# Patient Record
(demographics unavailable — no encounter records)

---

## 2024-12-30 NOTE — CURRENT MEDS
[Takes medication as prescribed] : takes [None] : Patient does not have any barriers to medication adherence [Yes] : Reviewed medication list for presence of high-risk medications. [FreeTextEntry1] : Reports compliance.

## 2024-12-30 NOTE — HISTORY OF PRESENT ILLNESS
[Spouse] : spouse [FreeTextEntry1] :  77y/o M with PMHx of GERD, DDD, HLD, HTN, IGT, BPH, depression, anxiety, hearing loss, PTSD, DM for 3 month follow up. Pt went to neurologist in August due to memory loss and prescribed Donepezil. Pt denies any concerns or complaints. [de-identified] :  75y/o M with PMHx of GERD, DDD, HLD, HTN, IGT, BPH, depression, anxiety, hearing loss, PTSD, DM for 3 month follow up. Pt went to neurologist in August due to memory loss and prescribed Donepezil. Pt denies any concerns or complaints.

## 2024-12-30 NOTE — REVIEW OF SYSTEMS
[Patient Intake Form Reviewed] : Patient intake form was reviewed [Hearing Loss] : hearing loss [Negative] : Heme/Lymph [Fever] : no fever [Chills] : no chills [Hot Flashes] : no hot flashes [Night Sweats] : no night sweats [Recent Change In Weight] : ~T no recent weight change [Discharge] : no discharge [Pain] : no pain [Redness] : no redness [Vision Problems] : no vision problems [Itching] : no itching [Earache] : no earache [Nasal Discharge] : no nasal discharge [Sore Throat] : no sore throat [Chest Pain] : no chest pain [Palpitations] : no palpitations [Shortness Of Breath] : no shortness of breath [Wheezing] : no wheezing [Cough] : no cough [Abdominal Pain] : no abdominal pain [Nausea] : no nausea [Constipation] : no constipation [Diarrhea] : no diarrhea [Vomiting] : no vomiting [Heartburn] : no heartburn [Melena] : no melena [Dysuria] : no dysuria [Incontinence] : no incontinence [Hesitancy] : no hesitancy [Frequency] : no frequency [Impotence] : no impotency [Joint Pain] : no joint pain [Muscle Pain] : no muscle pain [Muscle Weakness] : no muscle weakness [Back Pain] : no back pain [Joint Swelling] : no joint swelling [Itching] : no itching [Mole Changes] : no mole changes [Nail Changes] : no nail changes [Hair Changes] : no hair changes [Skin Rash] : no skin rash [Headache] : no headache [Memory Loss] : no memory loss [Suicidal] : not suicidal [Anxiety] : no anxiety [Depression] : no depression [Easy Bleeding] : no easy bleeding [Easy Bruising] : no easy bruising [Swollen Glands] : no swollen glands [FreeTextEntry4] : Hearing loss, tinnitus.

## 2024-12-30 NOTE — PLAN
[FreeTextEntry1] :  77y/o M with PMHx of GERD, DDD, HLD, HTN, IGT, BPH, depression, anxiety, hearing loss, PTSD, DM for 3 month follow up. Pt went to neurologist in August due to memory loss and prescribed Donepezil. Pt denies any concerns or complaints.  Pt care plan reviewed Medications and allergies reviewed Labs Drawn  RTC in 3 months

## 2024-12-30 NOTE — COUNSELING
[Fall prevention counseling provided] : Fall prevention counseling provided [Adequate lighting] : Adequate lighting [No throw rugs] : No throw rugs [Use proper foot wear] : Use proper foot wear [Behavioral health counseling provided] : Behavioral health counseling provided [Sleep ___ hours/day] : Sleep [unfilled] hours/day [Engage in a relaxing activity] : Engage in a relaxing activity [AUDIT-C Screening administered and reviewed] : AUDIT-C Screening administered and reviewed [Potential consequences of obesity discussed] : Potential consequences of obesity discussed [Benefits of weight loss discussed] : Benefits of weight loss discussed [Encouraged to increase physical activity] : Encouraged to increase physical activity [Encouraged to use exercise tracking device] : Encouraged to use exercise tracking device [Weigh Self Weekly] : weigh self weekly [Decrease Portions] : decrease portions [None] : None [Good understanding] : Patient has a good understanding of lifestyle changes and steps needed to achieve self management goal [FreeTextEntry2] : Never Smoker

## 2024-12-30 NOTE — HEALTH RISK ASSESSMENT
[Yes] : Yes [Monthly or less (1 pt)] : Monthly or less (1 point) [1 or 2 (0 pts)] : 1 or 2 (0 points) [Never (0 pts)] : Never (0 points) [No] : In the past 12 months have you used drugs other than those required for medical reasons? No [No falls in past year] : Patient reported no falls in the past year [0] : 2) Feeling down, depressed, or hopeless: Not at all (0) [PHQ-2 Negative - No further assessment needed] : PHQ-2 Negative - No further assessment needed [Patient/Caregiver not ready to engage] : , patient/caregiver not ready to engage [I will adhere to the patient's wishes.] : I will adhere to the patient's wishes. [Time Spent: ___ minutes] : Time Spent: [unfilled] minutes [Never] : Never [Audit-CScore] : 1 [de-identified] : average  [de-identified] : average  [facility] : [facility] [[id number]] : 0 [AdvancecareDate] : 06/22

## 2025-03-06 NOTE — HISTORY OF PRESENT ILLNESS
[FreeTextEntry1] : I saw him initially 8/6/2024 with the following history. This 75-year-old man was seen in neurological consultation today accompanied by his wife He has had some memory loss going on for the last year or 2, slowly progressive He has loud tinnitus and is hard of hearing which complicates the problem.  He refuses to wear a hearing aid. Manages fine with activities of daily living He does drive safely but wife has to give him directions a lot He has not done anything that would cause danger to himself or others  He had a brain MRI 11/2/2023 which showed small vessel ischemic changes Wife said he had an EEG which was normal. Thyroid functions and B12 have been normal  Medical history significant for hyperlipidemia, hypertension, anxiety and depression, low back pain for which she sees pain management  I started him on donepezil 5 mg a day Comes in today with his wife She reports some increased fatigue since starting the donepezil Memory has remained stable No GI complaints

## 2025-03-06 NOTE — PHYSICAL EXAM
[General Appearance - Alert] : alert [General Appearance - In No Acute Distress] : in no acute distress [General Appearance - Well-Appearing] : healthy appearing [Affect] : the affect was normal [Mood] : the mood was normal [Total Score ___ / 30] : the patient achieved a score of [unfilled] /30 [Date / Time ___ / 5] : date / time [unfilled] / 5 [Place ___ / 5] : place [unfilled] / 5 [Registration ___ / 3] : registration [unfilled] / 3 [Serial Sevens ___/5] : serial sevens [unfilled] / 5 [Naming 2 Objects ___ / 2] : naming two objects [unfilled] / 2 [Repeating a Sentence ___ / 1] : repeating a sentence [unfilled] / 1 [Writing a Sentence ___ / 1] : write sentence [unfilled] / 1 [3-stage Verbal Command ___ / 3] : three-stage verbal command [unfilled] / 3 [Written Command ___ / 1] : written command [unfilled] / 1 [Copy a Design ___ / 1] : copy a design [unfilled] / 1 [Recall ___ / 3] : recall [unfilled] / 3 [Cranial Nerves Optic (II)] : visual acuity intact bilaterally,  visual fields full to confrontation, pupils equal round and reactive to light [Cranial Nerves Oculomotor (III)] : extraocular motion intact [Cranial Nerves Facial (VII)] : face symmetrical [Cranial Nerves Hypoglossal (XII)] : there was no tongue deviation with protrusion [Motor Tone] : muscle tone was normal in all four extremities [Motor Strength] : muscle strength was normal in all four extremities [Paresis Pronator Drift Right-Sided] : no pronator drift on the right [Paresis Pronator Drift Left-Sided] : no pronator drift on the left [Sensation Pain / Temperature Decrease] : pain and temperature was intact [Romberg's Sign] : Romberg's sign was negtive [Abnormal Walk] : normal gait [Coordination - Dysmetria Impaired Finger-to-Nose Bilateral] : not present [1+] : Patella left 1+ [FreeTextEntry5] : Poor hearing [PERRL With Normal Accommodation] : pupils were equal in size, round, reactive to light, with normal accommodation [Extraocular Movements] : extraocular movements were intact [Full Visual Field] : full visual field

## 2025-03-06 NOTE — ASSESSMENT
[FreeTextEntry1] : Likely dementia Scored 22/30 on Mini-Mental state examination testing Workup has been unrevealing  I have explained in great detail that currently available medications work to help slow down further memory decline rather than reverse existing memory loss and they seem to understand this.  I have stressed the importance of mental and physical activity, adequate hydration, and eating a healthy Mediterranean style diet.  They are anxious to try any medication that might be helpful Will try increasing the donepezil to the therapeutic dose of 10 mg a day. Potential side effects especially gastrointestinal have been discussed  Office follow-up scheduled in 6 months and by phone prior to that if needed.

## 2025-04-15 NOTE — HEALTH RISK ASSESSMENT
[Yes] : Yes [Monthly or less (1 pt)] : Monthly or less (1 point) [1 or 2 (0 pts)] : 1 or 2 (0 points) [Never (0 pts)] : Never (0 points) [No] : In the past 12 months have you used drugs other than those required for medical reasons? No [No falls in past year] : Patient reported no falls in the past year [0] : 2) Feeling down, depressed, or hopeless: Not at all (0) [PHQ-2 Negative - No further assessment needed] : PHQ-2 Negative - No further assessment needed [Patient/Caregiver not ready to engage] : , patient/caregiver not ready to engage [I will adhere to the patient's wishes.] : I will adhere to the patient's wishes. [Time Spent: ___ minutes] : Time Spent: [unfilled] minutes [Never] : Never [Audit-CScore] : 1 [de-identified] : average  [de-identified] : average  [facility]] : 7 [[id number]] : 0 [AdvancecareDate] : 06/22

## 2025-04-15 NOTE — HISTORY OF PRESENT ILLNESS
[Spouse] : spouse [FreeTextEntry1] :  77y/o M with PMHx of GERD, DDD, HLD, HTN, IGT, BPH, depression, anxiety, hearing loss, PTSD, DM who presents for 3 month follow up. [de-identified] :  75y/o M with PMHx of GERD, DDD, HLD, HTN, IGT, BPH, depression, anxiety, hearing loss, PTSD, DM who presents for 3 month follow up. Patient states that he has back pain and is seeing a pain management specialist. Pt is accompanied by his wife and states that he stopped the donepezil prescribed by his neurologist due to sedation. Pt denies any other concerns or complaints. Patient denies any recent hospitalizations or illnesses. Denies fevers, chills, SOB, chest pain, or abdominal pain.

## 2025-04-15 NOTE — PLAN
[FreeTextEntry1] :  75y/o M with PMHx of GERD, DDD, HLD, HTN, IGT, BPH, depression, anxiety, hearing loss, PTSD, DM who presents for 3 month follow up. Patient states that he has back pain and is seeing a pain management specialist. Pt is accompanied by his wife and states that he stopped the donepezil prescribed by his neurologist due to sedation. Pt denies any other concerns or complaints. Patient denies any recent hospitalizations or illnesses. Denies fevers, chills, SOB, chest pain, or abdominal pain.  Pt care plan reviewed Discussed benefits of vitamins for his memory Medications reviewed Labs Drawn  RTC in 3 months

## 2025-04-15 NOTE — REVIEW OF SYSTEMS
[Patient Intake Form Reviewed] : Patient intake form was reviewed [Hearing Loss] : hearing loss [Negative] : Heme/Lymph [Memory Loss] : memory loss [Fever] : no fever [Chills] : no chills [Hot Flashes] : no hot flashes [Night Sweats] : no night sweats [Recent Change In Weight] : ~T no recent weight change [Discharge] : no discharge [Pain] : no pain [Redness] : no redness [Vision Problems] : no vision problems [Itching] : no itching [Earache] : no earache [Nasal Discharge] : no nasal discharge [Sore Throat] : no sore throat [Chest Pain] : no chest pain [Palpitations] : no palpitations [Shortness Of Breath] : no shortness of breath [Wheezing] : no wheezing [Cough] : no cough [Abdominal Pain] : no abdominal pain [Nausea] : no nausea [Constipation] : no constipation [Diarrhea] : no diarrhea [Vomiting] : no vomiting [Heartburn] : no heartburn [Melena] : no melena [Dysuria] : no dysuria [Incontinence] : no incontinence [Hesitancy] : no hesitancy [Frequency] : no frequency [Impotence] : no impotency [Joint Pain] : no joint pain [Muscle Pain] : no muscle pain [Muscle Weakness] : no muscle weakness [Back Pain] : back pain [Joint Swelling] : no joint swelling [Itching] : no itching [Mole Changes] : no mole changes [Nail Changes] : no nail changes [Hair Changes] : no hair changes [Skin Rash] : no skin rash [Headache] : no headache [Suicidal] : not suicidal [Anxiety] : no anxiety [Depression] : no depression [Easy Bleeding] : no easy bleeding [Easy Bruising] : no easy bruising [Swollen Glands] : no swollen glands [FreeTextEntry4] : Hearing loss, tinnitus.

## 2025-04-15 NOTE — ASSESSMENT
[FreeTextEntry1] :  77y/o M with PMHx of GERD, DDD, HLD, HTN, IGT, BPH, depression, anxiety, hearing loss, PTSD, DM who presents for 3 month follow up. Patient states that he has back pain and is seeing a pain management specialist. Pt is accompanied by his wife and states that he stopped the donepezil prescribed by his neurologist due to sedation. Pt denies any other concerns or complaints. Patient denies any recent hospitalizations or illnesses. Denies fevers, chills, SOB, chest pain, or abdominal pain.  Pt care plan reviewed Medications reviewed Labs Drawn  RTC in 3 months

## 2025-05-29 NOTE — ASSESSMENT
[FreeTextEntry1] :  75y/o M with PMHx of GERD, DDD, HLD, HTN, IGT, BPH, depression, anxiety, hearing loss, PTSD, DM who presents for 3 month follow up. Patient states that he has back pain and is seeing a pain management specialist. Pt is accompanied by his wife and states that he stopped the donepezil prescribed by his neurologist due to sedation. Pt denies any other concerns or complaints. Patient denies any recent hospitalizations or illnesses. Denies fevers, chills, SOB, chest pain, or abdominal pain.  Pt care plan reviewed Medications reviewed Labs Drawn  RTC in 3 months

## 2025-05-29 NOTE — PLAN
[FreeTextEntry1] : 77y/o M with PMHx of GERD, DDD, HLD, HTN, IGT, BPH, depression, anxiety, hearing loss, PTSD, DM who recently presented to [facility] on May 17th for SOB, coughing. Chest Xray was unremarkable. EEG was complete but unsure of results. Pt then underwent cardiac catheterization for reason unknown to pt and wife. Pt was started on metoprolol during hospital stay due to uncontrolled HTN. Pt to follow up with cardiologist. Per disc with wife, pt states patient has been doing significantly better. Pt denies recent illness, trauma. Pt denies CP, SOB, Fevers, chills, abd pain, n/v/d/c.   Pt care plan reviewed Medications reviewed RTC in 1 months

## 2025-05-29 NOTE — HISTORY OF PRESENT ILLNESS
[FreeTextEntry2] : 75y/o M with PMHx of GERD, DDD, HLD, HTN, IGT, BPH, depression, anxiety, hearing loss, PTSD, DM who recently presented to [facility] on May 17th for SOB, coughing. Chest Xray was unremarkable. EEG was complete but unsure of results. Pt then underwent cardiac catheterization for reason unknown to pt and wife. Pt was started on metoprolol during hospital stay due to uncontrolled HTN. Pt to follow up with cardiologist. Per disc with wife, pt states patient has been doing significantly better. Pt denies recent illness, trauma. Pt denies CP, SOB, Fevers, chills, abd pain, n/v/d/c.

## 2025-05-29 NOTE — HEALTH RISK ASSESSMENT
[Yes] : Yes [Monthly or less (1 pt)] : Monthly or less (1 point) [1 or 2 (0 pts)] : 1 or 2 (0 points) [Never (0 pts)] : Never (0 points) [No] : In the past 12 months have you used drugs other than those required for medical reasons? No [No falls in past year] : Patient reported no falls in the past year [0] : 2) Feeling down, depressed, or hopeless: Not at all (0) [PHQ-2 Negative - No further assessment needed] : PHQ-2 Negative - No further assessment needed [Patient/Caregiver not ready to engage] : , patient/caregiver not ready to engage [I will adhere to the patient's wishes.] : I will adhere to the patient's wishes. [Time Spent: ___ minutes] : Time Spent: [unfilled] minutes [Never] : Never [Audit-CScore] : 1 [de-identified] : average  [de-identified] : average  [facility] [[id number]] : 0 [AdvancecareDate] : 06/22

## 2025-05-29 NOTE — HISTORY OF PRESENT ILLNESS
[FreeTextEntry2] : 75y/o M with PMHx of GERD, DDD, HLD, HTN, IGT, BPH, depression, anxiety, hearing loss, PTSD, DM who recently presented to [facility] on May 17th for SOB, coughing. Chest Xray was unremarkable. EEG was complete but unsure of results. Pt then underwent cardiac catheterization for reason unknown to pt and wife. Pt was started on metoprolol during hospital stay due to uncontrolled HTN. Pt to follow up with cardiologist. Per disc with wife, pt states patient has been doing significantly better. Pt denies recent illness, trauma. Pt denies CP, SOB, Fevers, chills, abd pain, n/v/d/c.

## 2025-05-29 NOTE — HEALTH RISK ASSESSMENT
[Yes] : Yes [Monthly or less (1 pt)] : Monthly or less (1 point) [1 or 2 (0 pts)] : 1 or 2 (0 points) [Never (0 pts)] : Never (0 points) [No] : In the past 12 months have you used drugs other than those required for medical reasons? No [No falls in past year] : Patient reported no falls in the past year [0] : 2) Feeling down, depressed, or hopeless: Not at all (0) [PHQ-2 Negative - No further assessment needed] : PHQ-2 Negative - No further assessment needed [Patient/Caregiver not ready to engage] : , patient/caregiver not ready to engage [I will adhere to the patient's wishes.] : I will adhere to the patient's wishes. [Time Spent: ___ minutes] : Time Spent: [unfilled] minutes [Never] : Never [Audit-CScore] : 1 [de-identified] : average  [de-identified] : average  [facility]] : 7 [[id number]] : 0 [AdvancecareDate] : 06/22

## 2025-05-29 NOTE — PLAN
[FreeTextEntry1] : 75y/o M with PMHx of GERD, DDD, HLD, HTN, IGT, BPH, depression, anxiety, hearing loss, PTSD, DM who recently presented to [facility] on May 17th for SOB, coughing. Chest Xray was unremarkable. EEG was complete but unsure of results. Pt then underwent cardiac catheterization for reason unknown to pt and wife. Pt was started on metoprolol during hospital stay due to uncontrolled HTN. Pt to follow up with cardiologist. Per disc with wife, pt states patient has been doing significantly better. Pt denies recent illness, trauma. Pt denies CP, SOB, Fevers, chills, abd pain, n/v/d/c.   Pt care plan reviewed Medications reviewed RTC in 1 months

## 2025-05-29 NOTE — REVIEW OF SYSTEMS
[Patient Intake Form Reviewed] : Patient intake form was reviewed [Hearing Loss] : hearing loss [Back Pain] : back pain [Memory Loss] : memory loss [Negative] : Heme/Lymph [Fever] : no fever [Chills] : no chills [Hot Flashes] : no hot flashes [Night Sweats] : no night sweats [Recent Change In Weight] : ~T no recent weight change [Discharge] : no discharge [Pain] : no pain [Redness] : no redness [Vision Problems] : no vision problems [Itching] : no itching [Earache] : no earache [Nasal Discharge] : no nasal discharge [Sore Throat] : no sore throat [Chest Pain] : no chest pain [Palpitations] : no palpitations [Shortness Of Breath] : no shortness of breath [Wheezing] : no wheezing [Cough] : no cough [Abdominal Pain] : no abdominal pain [Nausea] : no nausea [Constipation] : no constipation [Diarrhea] : no diarrhea [Vomiting] : no vomiting [Heartburn] : no heartburn [Melena] : no melena [Dysuria] : no dysuria [Incontinence] : no incontinence [Hesitancy] : no hesitancy [Frequency] : no frequency [Impotence] : no impotency [Joint Pain] : no joint pain [Muscle Pain] : no muscle pain [Muscle Weakness] : no muscle weakness [Joint Swelling] : no joint swelling [Itching] : no itching [Mole Changes] : no mole changes [Nail Changes] : no nail changes [Hair Changes] : no hair changes [Skin Rash] : no skin rash [Headache] : no headache [Suicidal] : not suicidal [Anxiety] : no anxiety [Depression] : no depression [Easy Bleeding] : no easy bleeding [Easy Bruising] : no easy bruising [Swollen Glands] : no swollen glands [FreeTextEntry4] : Hearing loss, tinnitus.

## 2025-07-15 NOTE — HISTORY OF PRESENT ILLNESS
[Spouse] : spouse [FreeTextEntry1] :  75y/o M with PMHx of GERD, DDD, HLD, HTN, IGT, BPH, depression, anxiety, hearing loss, PTSD, DM who presents for 3 month follow up. [de-identified] :  77y/o M with PMHx of GERD, DDD, HLD, HTN, IGT, BPH, depression, anxiety, hearing loss, PTSD, DM who presents for 3 month follow up. His wife reports radiofrequency shots for back pain. She reports he uses arthritis cream for his back pain. He denies CP, SOB, abdominal pain and N/V/D. He follows with ortho for the back pain and cardio. He recieves radiofrequency ablation for his back pain. He had a cardiac cath in May. His wife states the results came back normal.

## 2025-07-15 NOTE — HEALTH RISK ASSESSMENT
[Yes] : Yes [Monthly or less (1 pt)] : Monthly or less (1 point) [1 or 2 (0 pts)] : 1 or 2 (0 points) [Never (0 pts)] : Never (0 points) [No] : In the past 12 months have you used drugs other than those required for medical reasons? No [No falls in past year] : Patient reported no falls in the past year [0] : 2) Feeling down, depressed, or hopeless: Not at all (0) [PHQ-2 Negative - No further assessment needed] : PHQ-2 Negative - No further assessment needed [Patient/Caregiver not ready to engage] : , patient/caregiver not ready to engage [I will adhere to the patient's wishes.] : I will adhere to the patient's wishes. [Time Spent: ___ minutes] : Time Spent: [unfilled] minutes [Never] : Never [Audit-CScore] : 1 [de-identified] : average  [de-identified] : average  [facility] : [facility] [[id number]] : 0 [AdvancecareDate] : 06/22

## 2025-07-15 NOTE — ASSESSMENT
[FreeTextEntry1] :  77y/o M with PMHx of GERD, DDD, HLD, HTN, IGT, BPH, depression, anxiety, hearing loss, PTSD, DM who presents for 3 month follow up. His wife reports radiofrequency shots for back pain. She reports he uses arthritis cream for his back pain. He denies CP, SOB, abdominal pain and N/V/D. He follows with ortho for the back pain and cardio. He recieves radiofrequency ablation for his back pain. He had a cardiac cath in May. His wife states the results came back normal. Pt care plan reviewed Medications reviewed Labs Drawn  RTC in 3 months

## 2025-07-15 NOTE — REVIEW OF SYSTEMS
[Patient Intake Form Reviewed] : Patient intake form was reviewed [Hearing Loss] : hearing loss [Back Pain] : back pain [Memory Loss] : memory loss [Negative] : Heme/Lymph [Fever] : no fever [Chills] : no chills [Hot Flashes] : no hot flashes [Night Sweats] : no night sweats [Recent Change In Weight] : ~T no recent weight change [Discharge] : no discharge [Pain] : no pain [Redness] : no redness [Vision Problems] : no vision problems [Itching] : no itching [Earache] : no earache [Nasal Discharge] : no nasal discharge [Sore Throat] : no sore throat [Chest Pain] : no chest pain [Palpitations] : no palpitations [Shortness Of Breath] : no shortness of breath [Wheezing] : no wheezing [Cough] : no cough [Abdominal Pain] : no abdominal pain [Nausea] : no nausea [Constipation] : no constipation [Diarrhea] : no diarrhea [Vomiting] : no vomiting [Heartburn] : no heartburn [Melena] : no melena [Dysuria] : no dysuria [Incontinence] : no incontinence [Hesitancy] : no hesitancy [Frequency] : no frequency [Impotence] : no impotency [Joint Pain] : no joint pain [Muscle Pain] : no muscle pain [Muscle Weakness] : no muscle weakness [Joint Swelling] : no joint swelling [Mole Changes] : no mole changes [Nail Changes] : no nail changes [Hair Changes] : no hair changes [Skin Rash] : no skin rash [Headache] : no headache [Suicidal] : not suicidal [Anxiety] : no anxiety [Depression] : no depression [Easy Bleeding] : no easy bleeding [Easy Bruising] : no easy bruising [Swollen Glands] : no swollen glands [FreeTextEntry4] : Hearing loss, tinnitus.

## 2025-07-15 NOTE — PLAN
[FreeTextEntry1] :  75y/o M with PMHx of GERD, DDD, HLD, HTN, IGT, BPH, depression, anxiety, hearing loss, PTSD, DM who presents for 3 month follow up. His wife reports radiofrequency shots for back pain. She reports he uses arthritis cream for his back pain. He denies CP, SOB, abdominal pain and N/V/D. He follows with ortho for the back pain and cardio. He recieves radiofrequency ablation for his back pain. He had a cardiac cath in May. His wife states the results came back normal.  Pt care plan reviewed Medications reviewed Labs Drawn  RTC in 3 months